# Patient Record
(demographics unavailable — no encounter records)

---

## 2024-11-04 NOTE — PHYSICAL EXAM
[Alert] : alert [No Acute Distress] : no acute distress [Playful] : playful [Normocephalic] : normocephalic [Conjunctivae with no discharge] : conjunctivae with no discharge [PERRL] : PERRL [EOMI Bilateral] : EOMI bilateral [Auricles Well Formed] : auricles well formed [Clear Tympanic membranes with present light reflex and bony landmarks] : clear tympanic membranes with present light reflex and bony landmarks [No Discharge] : no discharge [Nares Patent] : nares patent [Pink Nasal Mucosa] : pink nasal mucosa [Palate Intact] : palate intact [Nonerythematous Oropharynx] : nonerythematous oropharynx [Uvula Midline] : uvula midline [No Caries] : no caries [Supple, full passive range of motion] : supple, full passive range of motion [Trachea Midline] : trachea midline [No Palpable Masses] : no palpable masses [Symmetric Chest Rise] : symmetric chest rise [Clear to Auscultation Bilaterally] : clear to auscultation bilaterally [Normoactive Precordium] : normoactive precordium [Regular Rate and Rhythm] : regular rate and rhythm [Normal S1, S2 present] : normal S1, S2 present [No Murmurs] : no murmurs [+2 Femoral Pulses] : +2 femoral pulses [Soft] : soft [NonTender] : non tender [Non Distended] : non distended [Normoactive Bowel Sounds] : normoactive bowel sounds [No Splenomegaly] : no splenomegaly [No Hepatomegaly] : no hepatomegaly [Wade 1] : Wade 1 [No Clitoromegaly] : no clitoromegaly [Normal Vagina Introitus] : normal vagina introitus [Patent] : patent [Normally Placed] : normally placed [No Abnormal Lymph Nodes Palpated] : no abnormal lymph nodes palpated [Symmetric Buttocks Creases] : symmetric buttocks creases [Symmetric Hip Rotation] : symmetric hip rotation [No Gait Asymmetry] : no gait asymmetry [No pain or deformities with palpation of bone, muscles, joints] : no pain or deformities with palpation of bone, muscles, joints [Normal Muscle Tone] : normal muscle tone [No Spinal Dimple] : no spinal dimple [NoTuft of Hair] : no tuft of hair [Straight] : straight [+2 Patella DTR] : +2 patella DTR [Cranial Nerves Grossly Intact] : cranial nerves grossly intact [No Rash or Lesions] : no rash or lesions

## 2024-11-04 NOTE — HISTORY OF PRESENT ILLNESS
[Mother] : mother [Normal] : Normal [Sippy cup use] : Sippy cup use [Yes] : Patient goes to dentist yearly [Vitamin] : Primary Fluoride Source: Vitamin [In Pre-K] : In Pre-K [No] : Not at  exposure [Water heater temperature set at <120 degrees F] : Water heater temperature set at <120 degrees F [Car seat in back seat] : Car seat in back seat [Carbon Monoxide Detectors] : Carbon monoxide detectors [Smoke Detectors] : Smoke detectors [Supervised outdoor play] : Supervised outdoor play [Up to date] : Up to date [NO] : No [Exposure to electronic nicotine delivery system] : No exposure to electronic nicotine delivery system [FreeTextEntry7] : 3 YO WELL EXAM [de-identified] : GOOD DIET

## 2024-11-04 NOTE — DISCUSSION/SUMMARY
[Normal Growth] : growth [Normal Development] : development  [No Elimination Concerns] : elimination [Continue Regimen] : feeding [No Skin Concerns] : skin [Normal Sleep Pattern] : sleep [None] : no medical problems [School Readiness] : school readiness [Healthy Personal Habits] : healthy personal habits [TV/Media] : tv/media [Child and Family Involvement] : child and family involvement [Safety] : safety [No Vaccines] : no vaccines needed [Anticipatory Guidance Given] : Anticipatory guidance addressed as per the history of present illness section [No Medications] : ~He/She~ is not on any medications [] : The components of the vaccine(s) to be administered today are listed in the plan of care. The disease(s) for which the vaccine(s) are intended to prevent and the risks have been discussed with the caretaker.  The risks are also included in the appropriate vaccination information statements which have been provided to the patient's caregiver.  The caregiver has given consent to vaccinate. [FreeTextEntry1] : MMR VARIVAX AND FLU GIVEN TODAY Provided counseling on the diseases to be vaccinated against as well as the risks/benefits of providing and withholding recommended vaccines to be given today to [facility] .All questions were answered and the parent verbalized understanding.  HEARING NWL  VISION WNL  UA IN OFFICE   CBC SENT TO LAB  TB risk assessment completed- no risk for TB. PPD not required  Discussed safety/feeding/sleep as appropriate for age.  Time allowed for questions and all answered with understanding.

## 2024-11-25 NOTE — DISCUSSION/SUMMARY
[FreeTextEntry1] : 4 year girl found to be rapid strep positive. Complete 10 days of antibiotics. Use antipyretics as needed. Return for follow up in 2 weeks. After being on antibiotics for atleast 24 hours patient less likely to spread infection.

## 2024-11-25 NOTE — HISTORY OF PRESENT ILLNESS
[de-identified] : fever [FreeTextEntry6] : 4 year old with fever x 1 day sib has STREP no cough no new runny nose

## 2025-01-06 NOTE — DISCUSSION/SUMMARY
[FreeTextEntry1] : 4 year girl found to be rapid strep positive. Complete 10 days of antibiotics. Use antipyretics as needed.  After being on antibiotics for atleast 24 hours patient less likely to spread infection. not unusual for her to be positive despite being asymptomatic use OTC cough products prn

## 2025-01-06 NOTE — PHYSICAL EXAM
[Clear] : right tympanic membrane clear [Clear to Auscultation Bilaterally] : clear to auscultation bilaterally [NL] : warm, clear [Erythematous Oropharynx] : nonerythematous oropharynx [Enlarged Tonsils] : tonsils not enlarged [Vesicles] : no vesicles [Exudate] : no exudate

## 2025-01-06 NOTE — REVIEW OF SYSTEMS
[Fever] : fever [Cough] : cough [Negative] : Genitourinary [Chills] : no chills [Headache] : no headache [Ear Pain] : no ear pain [Nasal Discharge] : no nasal discharge [Nasal Congestion] : no nasal congestion [Sore Throat] : no sore throat

## 2025-01-06 NOTE — HISTORY OF PRESENT ILLNESS
[de-identified] : cough fever strep exposures [FreeTextEntry6] : today has fever of 101 at home cough x 2 days strep exposure to parents  strep has been a household issue of late patient asymptomatic in that regaerd

## 2025-02-03 NOTE — DISCUSSION/SUMMARY
[FreeTextEntry1] : Patient likely with viral pharyngitis. Rapid strep perfromed in office is negative. Will send throat culture to rule out strep. Recommend supportive care with antipyretics, salt water gargles, and if age-appropriate throat lozenges. fever must not go beyond another 2 days if so wuill reevaluate antipyretics should be used judiciously to alleviate fever and associated irritability so as to promote better rest.adequate hydration is imperative as well.depending on age either acetaminophen or ibuprofen can be used with dosing based on weight of infant/child;not age. strict adherence to dosing protocols must be observed and office to be contacted with additional concerns or prolonged duration of fever.

## 2025-02-03 NOTE — REVIEW OF SYSTEMS
[Fever] : fever [Cough] : cough [Negative] : Skin [Headache] : no headache [Ear Pain] : no ear pain [Sore Throat] : no sore throat [Tachypnea] : not tachypneic [Wheezing] : no wheezing

## 2025-02-03 NOTE — HISTORY OF PRESENT ILLNESS
[de-identified] : fevers and flu exposures [FreeTextEntry6] : has had intermittent fevers since thursday  flu exposures and has had flu vaccine siblings with flu last week

## 2025-02-03 NOTE — PHYSICAL EXAM
[Clear] : right tympanic membrane clear [Erythematous Oropharynx] : erythematous oropharynx [Clear to Auscultation Bilaterally] : clear to auscultation bilaterally [Soft] : soft [NL] : warm, clear [Acute Distress] : no acute distress [Vesicles] : no vesicles [Exudate] : no exudate [Tender] : nontender [Distended] : nondistended

## 2025-05-05 NOTE — DISCUSSION/SUMMARY
[FreeTextEntry1] : QUADRACEL GIVEN TODAY WELL EXAM AT 4 YO IN OCT REC FLU VAC IN FALL  Provided counseling on the diseases to be vaccinated against as well as the risks/benefits of providing and withholding recommended vaccines to be given today to [facility] .All questions were answered and the parent verbalized understanding.

## 2025-07-11 NOTE — PHYSICAL EXAM
[NL] : moves all extremities x4, warm, well perfused x4 [de-identified] : pink lacy rash on arms legs and abd

## 2025-07-11 NOTE — HISTORY OF PRESENT ILLNESS
[de-identified] : RASH [FreeTextEntry6] : rash x 2 days on arms, legs and abd not itchy no fever cheeks were red initially